# Patient Record
Sex: FEMALE | Race: WHITE | NOT HISPANIC OR LATINO | Employment: FULL TIME | URBAN - METROPOLITAN AREA
[De-identification: names, ages, dates, MRNs, and addresses within clinical notes are randomized per-mention and may not be internally consistent; named-entity substitution may affect disease eponyms.]

---

## 2023-12-11 ENCOUNTER — LAB (OUTPATIENT)
Dept: LAB | Facility: MEDICAL CENTER | Age: 28
End: 2023-12-11
Payer: COMMERCIAL

## 2023-12-11 ENCOUNTER — ULTRASOUND (OUTPATIENT)
Dept: OBGYN CLINIC | Facility: MEDICAL CENTER | Age: 28
End: 2023-12-11
Payer: COMMERCIAL

## 2023-12-11 ENCOUNTER — TELEPHONE (OUTPATIENT)
Dept: OBGYN CLINIC | Facility: CLINIC | Age: 28
End: 2023-12-11

## 2023-12-11 VITALS
SYSTOLIC BLOOD PRESSURE: 122 MMHG | WEIGHT: 162 LBS | DIASTOLIC BLOOD PRESSURE: 70 MMHG | BODY MASS INDEX: 29.81 KG/M2 | HEIGHT: 62 IN

## 2023-12-11 DIAGNOSIS — O09.899 HISTORY OF PRETERM DELIVERY, CURRENTLY PREGNANT: ICD-10-CM

## 2023-12-11 DIAGNOSIS — Z36.89 ESTABLISH GESTATIONAL AGE, ULTRASOUND: ICD-10-CM

## 2023-12-11 DIAGNOSIS — O36.80X0 PREGNANCY WITH UNCERTAIN FETAL VIABILITY, SINGLE OR UNSPECIFIED FETUS: ICD-10-CM

## 2023-12-11 DIAGNOSIS — N91.2 AMENORRHEA: Primary | ICD-10-CM

## 2023-12-11 LAB — B-HCG SERPL-ACNC: ABNORMAL MIU/ML (ref 0–5)

## 2023-12-11 PROCEDURE — 99213 OFFICE O/P EST LOW 20 MIN: CPT | Performed by: CLINICAL NURSE SPECIALIST

## 2023-12-11 PROCEDURE — 36415 COLL VENOUS BLD VENIPUNCTURE: CPT

## 2023-12-11 PROCEDURE — 76817 TRANSVAGINAL US OBSTETRIC: CPT | Performed by: CLINICAL NURSE SPECIALIST

## 2023-12-11 PROCEDURE — 84702 CHORIONIC GONADOTROPIN TEST: CPT

## 2023-12-11 NOTE — PROGRESS NOTES
Subjective  Patient ID: Harinder Stanford is a 25 y.o. female here for Pregnancy Ultrasound (Lmp 10/14 - unsure /Candida w2d/Patient says she is feeling great/Pregnancy is unplanned but welcomed she is here by herself today/Periods are regular )    Newly Pregnant  Patient's last menstrual period was 10/14/2023. [Uncertain date] giving her an CANDIDA of 24 and a gestational age of  11 weeks 2days (based on LMP)    Menstrual cycle: regular, cycle length:  uncertain- she doesn't track periods  Pregnancy was unplanned/surpised. She has started taking a prenatal vitamin    She is C/O amenorrhea  Signs and symptoms of pregnancy:   Breast tenderness: yes  Fatigue: yes  Cramping or Pelvic Pain: no  Spotting or Vaginal Bleeding: no  Nausea or vomiting: yes    OB History    Para Term  AB Living   2 1 0 1 0 1   SAB IAB Ectopic Multiple Live Births   0 0 0 0 1      # Outcome Date GA Lbr Joni/2nd Weight Sex Delivery Anes PTL Lv   2 Current            1  10/28/21 33w3d / 00:34 2350 g (5 lb 2.9 oz) M Vag-Spont EPI  MOODY        The following portions of the patient's history were reviewed and updated as appropriate: allergies, current medications, past family history, past medical history, past social history, past surgical history, and problem list.    Perinent hx that may affect pregnancy:  Hx of PTD [33w3d]      Review of Systems    See HPI for pertinent positives. /70 (BP Location: Left arm, Patient Position: Sitting, Cuff Size: Standard)   Ht 5' 2" (1.575 m)   Wt 73.5 kg (162 lb)   LMP 10/14/2023   Breastfeeding No   BMI 29.63 kg/m²   OBGyn Exam      FIRST TRIMESTER OBSTETRIC ULTRASOUND     Patient's last menstrual period was 10/14/2023.     INDICATION: Establish Gestational Age       FINDINGS:  See imaging report for details     Additional Findings: none     FHR: n/a  IMPRESSION:    Single intrauterine pregnancy of 6weeks 1days gestational age  S<D (uncertain LMP)  Fetal cardiac activity NOT detected. No adnexal masses seen. EDC by LMP: 24  EDC by this Ultrasound: 24      Assigning a Final CANDIDA  Please choose how you are assigning the CANDIDA: The LMP is unknown or uncertain, therefore the final CANDIDA will be based on today's ultrasound    Final CANDIDA:to be determined    Julia Whitney  [address] [facility],[address]  [facility] [address]  Dept: [phone]  Dept Fax: [phone]: [phone] Fax: [phone]  Ultrasound Probe Disinfection    A transvaginal ultrasound was performed. Prior to use, disinfection was performed with High Level Disinfection Process ([employer]). Probe serial number: [id number] was used. Assessment/Plan:         1. Amenorrhea  -     AMB US OB < 14 weeks single or first gestation level 1    2. Establish gestational age, ultrasound  -     AMB US OB < 14 weeks single or first gestation level 1    3. History of  delivery, currently pregnant    4. Pregnancy with uncertain fetal viability, single or unspecified fetus  Assessment & Plan:  She is a  with Patient's last menstrual period was 10/14/2023. Ultrasound today is showing an IUP of 6w 1d w/o FHR  LMP uncertain. Uncertain viability- she is asymptomatic  Serial HCG and rpt us in 2 wks  SAB precautions reviewed. Orders:  -     hCG, quantitative; Future  -     hCG, quantitative;  Future        Orders Placed This Encounter   Procedures    hCG, quantitative    hCG, quantitative    AMB US OB < 14 weeks single or first gestation level 1

## 2023-12-11 NOTE — PATIENT INSTRUCTIONS
Again, congratulations on your pregnancy! NEXT STEPS  Get Prenatal bloodwork  Call MFM to schedule next ultrasound (done 12-13 wks)   Contact information for [facility] ([facility])- Main Number [phone]   Return to our office in 1-2 weeks for an OB intake and initial prenatal Exam(or sooner if any problems/concerns arise- see packet for things to report)  Check out [employer] website to read the "Pregnancy Essentials Guide" -this has lots of great early pregnancy information     It can be found by going to [employer]-->select services-->select women's health-->select Obstetrics  [email or web address]    Below is a variety of information that is useful in early pregnancy   Genetic screening can be very confusing for most people   We do recommend genetic screening universally for all pregnant women. Our goal is to have the most healthy uncomplicated pregnancy possible and this is one way to identify possible complications early. Common disorders we can screen for include: Down Syndrome, Neural tube defects ( like spina bifida or gastroschisis) and trisomy 15, even possibly more  There are several options we will talk more about. Below is some information to get you started thinking about this. We will discuss this more at the next appt. GUIDE TO GENETIC TESTING    Aneuploidy Testing  Trisomy 21 (Down Syndrome), Trisomy 18, and Open Neural Tube Defects (Spina Bifida). You may choose one of the following options: See below for CPT/Diagnostic codes  NIPT (Non-Invasive Prenatal Testing or Cell Free- Fetal DNA): This simple and accurate non-invasive prenatal screening blood test offers results for early risk assessment of Down syndrome (Trisomy 21), or Trisomy 25, trisomy 15 and other aneuploidy conditions. The test also offers an optional analysis for fetal sex.   The test analyzes the relative amount of 21, 18, 13; X and Y chromosome material in circulating cell-free DNA from a maternal blood sample. This test can be performed at any time after 10 weeks gestation. If you elect this test, you will also have an AFP (alpha-fetoprotein) blood test to test for open neural tube defects. Recommended follow up to a positive result is genetic counseling and prenatal diagnosis. Sequential Screening with Nuchal Translucency: This is a two-step test to detect whether a fetus is at increased risk for trisomy 24, trisomy 25, trisomy 15 and open neural tube defects. The test has a narrow window for testing (the first step must be performed between 10 and 13 weeks gestation). It includes two blood draws and an ultrasound. The ultrasound measures the amount of fluid behind the baby’s neck called the nuchal translucency (NT). The blood tests measures three different maternal hormone levels, -- pregnancy associated plasma protein (KRISTI-A), human chorionic gonadotrophin (hCG), and dimeric inhibin A (OSMAN). These measurements in combination with some maternal information such as height and weight are used to calculate whether the baby is at increased risk for Down Syndrome or Trisomy 18. An alpha-fetoprotein test (AFP) is also included to test for open neural tube defects. Recommended follow up to a positive result is additional testing that is more definitive, and referral for genetic counseling and prenatal diagnosis. Quad Screen: This test is also known as the quadruple marker test.  It is a test that measures levels of four substances in a pregnant woman’s blood--Alpha-fetoprotein (AFP), a protein made by the developing baby; Human chorionic gonadotropin (hCG), a hormone made by the placenta; Estriol, a hormone made by the placenta and the baby’s liver; and Inhibin A, another hormone made by the placenta.   Typically, the quad screen is done between weeks 15 and 20 of pregnancy--the second trimester and the results indicate whether the baby is at higher risk for Down Syndrome (Trisomy 21), Trisomy 18, and open neural tube defects. This is a screening test.  Recommended follow up to a positive result is additional testing that is more definitive, as well as referral for genetic counseling and prenatal diagnosis. Trisomy 21 Trisomy 18 Trisomy 13   NIPT  (FPR 0.1%) <99% <98% 99%   Sequential Screening (FPR 3.5%) 92% 90% N/A   Quad Screen   (FPR 5%) 83% 80% N/A   (FPR is False Positive Rate)       Additional Screening Tests Offered  Cystic Fibrosis: Cystic Fibrosis is the most common inherited disease of children and young adults. The carrier frequency is 1 in 24, to 1 in 97. Both parents need to be carriers for a child to be affected (25% chance). One in 200, children born are affected. Cystic fibrosis is a disorder of mucus production and produces abnormally thick mucus leading to life threatening lung infections, digestion problems, poor growth, infertility, and more. Symptoms range from mild to severe, but individuals with severe disease may die in childhood. With treatments today, people with Cystic Fibrosis can live into their 30’s. CF does not affect intelligence. Recommended follow up to a positive result is testing of the baby’s father. Spinal Muscular Atrophy (SMA): SMA is the most common inherited cause of early childhood death. The carrier frequency is 1 in 52 to 1 in 67 in the [place] and both parents need to be carriers for a child to be affected (25% chance). 1 in 11,000 children are affected. SMA is a progressive degeneration of lower motor neurons. Muscle weakness is the most common type with respiratory failure by the age of 3years old. Muscles responsible for crawling, walking, swallowing, and head and neck control are most severely affected. Recommended follow up to a positive result is testing of the baby’s father.       Fragile X Syndrome (the most common inherited cause of developmental delays): Fragile X syndrome is an “X-linked” genetic disease, which means it is only carried by the mom. Unfortunately, 1 in 250 females are carriers and a child has a 50% chance of being affected if this is the case. 1 in 4000 boys is affected with Fragile X and 1 in 8000 girls is affected. Approximately 1/3 of all children born with Fragile X also have autism and hyperactivity. Recommended follow up to a positive result is genetic counseling and prenatal diagnosis. Guide To Insurance Coverage For Genetic Screening  Due to the complexity of coverage guidelines, we are unable to quote benefits or guarantee insurance coverage for any of these tests. Insurance benefits are plan-specific and offer vastly different coverage based on your policy. The handout attached explains the benefits to each test, and also provides the billing (CPT) codes for each test.  Even if the testing is covered, it could be applied to any unmet deductibles, and copays may apply, resulting in a bill. You are encouraged to contact your insurance company to obtain your benefits based on your age and risk factors, so that there are no surprises. Test Insurance Procedure (CPT) code   NIPT-cell free fetal DNA Most accurate non-invasive test- not always covered w/o risk factors 60856   Sequential Screen w/ NT US Current standard test-should be covered Part 1: (if lab is [facility]) [address]   (If lab is [address]) [address]  Part 2: ([facility]14,[address], [address]  (If lab is [facility]) [address]   Quad screen Old standard-use if past 1st trimester [address], [address] [place] [address], [address], [address]   CF- Cystic Fibrosis  65857   SMA- Spinal Musc.  Atrophy  10830   Fragile X  [id number]       Diagnosis code used Varies based on history- But will be one of these:  Encounter for  screening for other genetic defect Z36.8  Advanced Maternal Age (over 28) O12.46  Family History of Genetic Disease Carrier Z84.81    Please contact your insurance company with the appropriate CPT code from the attached list, and diagnosis code applicable to your situation. We ask that you review this information and decide what testing you would like to have performed. Please note that the Sequential Screening with Nuchal Translucency has a smaller window of time to be performed during pregnancy (Prior to 14 wks). Warning Signs During Pregnancy  The list below includes warning signs your providers would like you to be aware of. If you experience any of these at any time during your pregnancy, please call us as soon as possible. Vaginal bleeding   Sharp abdominal pain that does not go away   Fever (more than 100. 4? F and is not relieved with Tylenol)   Persistent vomiting lasting greater than 24 hours   Chest pain   Pain or burning when you urinate   Severe headache that doesn’t resolve with Tylenol   Blurred vision or seeing spots in your vision   Sudden swelling of your face or hands   Redness, swelling or pain in a leg   A sudden weight gain in just a few days   Decrease in your baby’s movements (after 28 weeks or the 6th month of pregnancy)   A loss of watery fluid from your vagina - can be a gush, a trickle or  continuous wetness   After 20 weeks of pregnancy, rhythmic cramping (greater than 4 per hour)  or menstrual like low/pelvic pain    Call the OFFICE [phone] for any questions/emergencies. At night or on the weekend, please indicate it is an emergency and the DOCTOR on call will be paged.     Discomforts of Early Pregnancy    Tips for coping with nausea and vomiting during pregnancy   Eat meals and snacks slowly   Eat every 1-2 hours to avoid a full stomach   Don’t skip meals, avoid empty stomach   Eat a snack prior to getting out of bed   Avoid food and beverages with a strong aroma   Avoid dehydration - drink enough fluid to keep the urine pale yellow   Drink fluids before a meal to minimize the effect of a full stomach   Limit the amount of coffee and beverages that contain caffeine Eliminate spicy, odorous, high fat (fried foods), acidic (tomato products) and sweet foods   Fluids that contain lemon (lemonade), mint (tea) or orange can usually be well tolerated   Snacks and meals that contain low-fat protein (lean meats, fish, poultry and eggs) along with eating easily digestible carbs (fruit, rice, toast, crackers and dry cereal) may be tolerated better   Foods with ginger may be well tolerated. May use ginger root powder, capsules or extract (up to 1000 mg per day)   Drink liquids in small amounts    If symptoms persist, please contact your provider. Tips for coping with constipation during pregnancy   Increase fiber and fluids.  - Drink 8-10 cups of liquid, like water or low-sugar juice daily  - Keep urine pale with fluids (water, milk), fruit and vegetables   Eat a well-balanced diet that contains high fiber food (fruits, vegetables, whole grain breads and cereals, bran and dried beans)   Take a 30-minute walk daily   You may take a mild stool softener such as Colace®    If symptoms persist, please contact your provider. For any emergencies, PLEASE CALL THE OFFICE at [phone]. If the office is closed, the doctor on call will be paged by the answering service. Medications and Pregnancy- see Pregnancy Essentials guide online- page 9    Expected Weight Gain During Pregnancy  If you have a healthy BMI (18-25) prior to pregnancy:  The recommended weight gain is between 25-35 pounds. Approximate weight gain  in the first trimester is 1-4.5 pounds. An expected weight gain during the second and  third trimester is approximately one pound per week. If you have a BMI of less than 18 prior to pregnancy,  you are considered underweight:  The recommended weight gain is between 28-40 pounds. Approximate weight gain  in the first trimester is 1-4.5 pounds. An expected weight gain during the second and  third trimester is just over one pound per week.   If your BMI is 25 to 29.9: you are considered overweight:  You should gain 1/2 to 2/3 pound during the second and third trimester, for a total  weight gain of 15 to 25 pounds. If you have a BMI of greater than 30 prior to pregnancy,  you are considered overweight:  The recommended weight gain is between 15-25 pounds. Approximate weight gain  in the first trimester is 1-4.5 pounds. An expected weight gain during the second  and third trimester is approximately 0.5 pound per week. Foods to avoid during pregnancy:   Unpasteurized milk, juice and cheese  - Soft cheeses like feta or brie (if made with UNPASTEURIZED milk)   Unheated deli meats like lunchmeat and hotdogs   Undercooked poultry, beef, pork, seafood including raw sushi    What fish is safe to eat during pregnancy? Eat 8 to 12 ounces of fish a week. Pick from this group frequently, especially if you follow  the American Heart Association’s recommendation to eat fish at least 2 times a week. AVOID HIGH MERCURY FISH  A single meal of the following fish can put  you over the Environmental Protection  Agency’s safe limit for the month. High mercury fish:  Shark  Swordfish  [employer]  Tile Fish    Caffeine and Pregnancy    The  and [employer] of Obstetrics and Gynecologists (ACOG) urge pregnant  women to limit their caffeine consumption to no more than 200 milligrams (mg) per day. This is  comparable to having one 12-ounce cup of coffee a day. This level has been shown not to increase risk  of miscarriage, growth or  labor complications. Effects of higher levels are not known. Exercise During Pregnancy  A daily exercise program that consists of 30 minutes a day is recommended. Low impact exercises like walking and swimming are great exercises throughout  all of pregnancy   If you’re an avid strength  avoid lifting very heavy weights - nothing more  than 30 pounds    Drink plenty of fluids while exercising to stay hydrated.   Be careful to avoid overheating. ACTIVITIES TO AVOID   Exercises that can make you lose your balance. Activities that can put your baby at risk i.e. horseback riding, scuba diving, skiing  or snowboarding. Any other sport that puts you at risk for getting hit in the  abdominal area. Do not use saunas, steam rooms or hot tubs (that have a higher temperature  than 100F)   After the first trimester, avoid exercises that require you to lay flat on your back. Avoid exceeding a heart rate greater than 140 beats per minute.  As long as you are  able to hold a conversation while exercising your heart rate is likely acceptable

## 2023-12-11 NOTE — TELEPHONE ENCOUNTER
----- Message from John Briggs, [address] [place] [address] sent at 12/11/2023  3:10 PM EST -----  HCG noted. Higher than expected.  Awaiting repeat value in 2-3 days

## 2023-12-11 NOTE — ASSESSMENT & PLAN NOTE
She is a  with Patient's last menstrual period was 10/14/2023. Ultrasound today is showing an IUP of 6w 1d w/o FHR  LMP uncertain. Uncertain viability- she is asymptomatic  Serial HCG and rpt us in 2 wks  SAB precautions reviewed.

## 2023-12-14 ENCOUNTER — LAB (OUTPATIENT)
Dept: LAB | Facility: MEDICAL CENTER | Age: 28
End: 2023-12-14
Payer: COMMERCIAL

## 2023-12-14 DIAGNOSIS — O36.80X0 PREGNANCY WITH UNCERTAIN FETAL VIABILITY, SINGLE OR UNSPECIFIED FETUS: ICD-10-CM

## 2023-12-14 LAB — B-HCG SERPL-ACNC: ABNORMAL MIU/ML (ref 0–5)

## 2023-12-14 PROCEDURE — 84702 CHORIONIC GONADOTROPIN TEST: CPT

## 2023-12-14 PROCEDURE — 36415 COLL VENOUS BLD VENIPUNCTURE: CPT

## 2023-12-15 NOTE — RESULT ENCOUNTER NOTE
Hcg no longer increasing and dropped slightly   Increased concern for missed ab. Has rpt US scheduled with us but not till Jan 4th. Earliest to be done would be 12/22- that would be 11 days since last [address] and she is agreeable.

## 2023-12-28 ENCOUNTER — HOSPITAL ENCOUNTER (OUTPATIENT)
Dept: ULTRASOUND IMAGING | Facility: HOSPITAL | Age: 28
End: 2023-12-28
Payer: COMMERCIAL

## 2023-12-28 DIAGNOSIS — O36.80X0 PREGNANCY WITH UNCERTAIN FETAL VIABILITY, SINGLE OR UNSPECIFIED FETUS: ICD-10-CM

## 2023-12-28 PROCEDURE — 76801 OB US < 14 WKS SINGLE FETUS: CPT

## 2023-12-29 ENCOUNTER — TELEPHONE (OUTPATIENT)
Dept: OBGYN CLINIC | Facility: CLINIC | Age: 28
End: 2023-12-29

## 2024-01-03 ENCOUNTER — TELEPHONE (OUTPATIENT)
Dept: OBGYN CLINIC | Facility: CLINIC | Age: 29
End: 2024-01-03

## 2024-01-03 NOTE — TELEPHONE ENCOUNTER
----- Message from MAY Shaw sent at 1/3/2024  4:09 PM EST -----  US from 12/28 confirming failed pregnancy   Per nsg notes appears to have be routed to another provider since I was out of office, but not clear if f/u was made to pt or not.  Called pt today to discuss- no answer and no  msg set up.   [facility] msg sent  Please f/u with pt- should have visit- even if virtual to discuss management of missed ab.

## 2024-01-03 NOTE — TELEPHONE ENCOUNTER
----- Message from MAY Shaw sent at 1/3/2024  4:09 PM EST -----  US from 12/28 confirming failed pregnancy   Per nsg notes appears to have be routed to another provider since I was out of office, but not clear if f/u was made to pt or not.  Called pt today to discuss- no answer and no  msg set up.   [facility] msg sent  Please f/u with pt- should have visit- even if virtual to discuss management of missed ab.